# Patient Record
Sex: FEMALE | Race: ASIAN | NOT HISPANIC OR LATINO | Employment: OTHER
[De-identification: names, ages, dates, MRNs, and addresses within clinical notes are randomized per-mention and may not be internally consistent; named-entity substitution may affect disease eponyms.]

---

## 2017-05-09 ENCOUNTER — HOSPITAL (OUTPATIENT)
Dept: OTHER | Age: 62
End: 2017-05-09
Attending: FAMILY MEDICINE

## 2021-01-05 ENCOUNTER — HOSPITAL ENCOUNTER (OUTPATIENT)
Dept: ULTRASOUND IMAGING | Age: 66
Discharge: HOME | End: 2021-01-05
Attending: FAMILY MEDICINE

## 2021-01-05 DIAGNOSIS — N18.31 STAGE 3A CHRONIC KIDNEY DISEASE (CMD): ICD-10-CM

## 2021-01-05 PROCEDURE — 76775 US EXAM ABDO BACK WALL LIM: CPT

## 2021-04-19 ENCOUNTER — HOSPITAL ENCOUNTER (OUTPATIENT)
Dept: GENERAL RADIOLOGY | Age: 66
Discharge: HOME | End: 2021-04-19

## 2021-04-19 DIAGNOSIS — R29.898 WEAKNESS OF BOTH LEGS: ICD-10-CM

## 2021-04-19 PROCEDURE — 73522 X-RAY EXAM HIPS BI 3-4 VIEWS: CPT

## 2021-04-19 PROCEDURE — 72110 X-RAY EXAM L-2 SPINE 4/>VWS: CPT

## 2021-04-19 PROCEDURE — 73562 X-RAY EXAM OF KNEE 3: CPT

## 2021-07-29 ENCOUNTER — TELEPHONE (OUTPATIENT)
Dept: NEUROLOGY | Facility: CLINIC | Age: 66
End: 2021-07-29

## 2021-07-29 NOTE — TELEPHONE ENCOUNTER
Patient's son called to schedule. Created appointment, currently have [facility] hmo referral on file.

## 2021-08-26 ENCOUNTER — OFFICE VISIT (OUTPATIENT)
Dept: NEUROLOGY | Facility: CLINIC | Age: 66
End: 2021-08-26
Payer: COMMERCIAL

## 2021-08-26 ENCOUNTER — TELEPHONE (OUTPATIENT)
Dept: NEUROLOGY | Facility: CLINIC | Age: 66
End: 2021-08-26

## 2021-08-26 VITALS — SYSTOLIC BLOOD PRESSURE: 140 MMHG | HEART RATE: 93 BPM | DIASTOLIC BLOOD PRESSURE: 79 MMHG | RESPIRATION RATE: 16 BRPM

## 2021-08-26 DIAGNOSIS — G20 PARKINSONISM, UNSPECIFIED PARKINSONISM TYPE (HCC): Primary | ICD-10-CM

## 2021-08-26 PROCEDURE — 3078F DIAST BP <80 MM HG: CPT | Performed by: OTHER

## 2021-08-26 PROCEDURE — 99203 OFFICE O/P NEW LOW 30 MIN: CPT | Performed by: OTHER

## 2021-08-26 PROCEDURE — 3077F SYST BP >= 140 MM HG: CPT | Performed by: OTHER

## 2021-08-26 RX ORDER — ATORVASTATIN CALCIUM 10 MG/1
1 TABLET, FILM COATED ORAL DAILY
COMMUNITY
Start: 2021-06-01

## 2021-08-26 RX ORDER — CARVEDILOL 12.5 MG/1
1 TABLET ORAL 2 TIMES DAILY
COMMUNITY
Start: 2021-06-01

## 2021-08-26 RX ORDER — LOSARTAN POTASSIUM 100 MG/1
1 TABLET ORAL DAILY
COMMUNITY
Start: 2021-06-01

## 2021-08-26 RX ORDER — PIOGLITAZONEHYDROCHLORIDE 30 MG/1
1 TABLET ORAL DAILY
COMMUNITY
Start: 2021-06-01

## 2021-08-26 RX ORDER — ACARBOSE 100 MG/1
1 TABLET ORAL 2 TIMES DAILY
COMMUNITY
Start: 2021-04-28

## 2021-08-26 RX ORDER — GLIPIZIDE 5 MG/1
1 TABLET ORAL 2 TIMES DAILY
COMMUNITY
Start: 2021-06-01

## 2021-08-26 RX ORDER — AMLODIPINE BESYLATE 5 MG/1
1 TABLET ORAL DAILY
COMMUNITY
Start: 2021-08-02

## 2021-08-26 RX ORDER — ALENDRONATE SODIUM 70 MG/1
1 TABLET ORAL WEEKLY
COMMUNITY
Start: 2021-06-01

## 2021-08-26 RX ORDER — LEVOTHYROXINE SODIUM 0.07 MG/1
1 TABLET ORAL DAILY
COMMUNITY
Start: 2021-06-01

## 2021-08-26 NOTE — PATIENT INSTRUCTIONS
Dr. Monique updated with concerns of worsening drainage on wounds.    Per Dr. Monique, recommends to have cultures of area collected.     Dr. Morales and team updated.    Refill policies:    • Allow 2-3 business days for refills; controlled substances may take longer.   • Contact your pharmacy at least 5 days prior to running out of medication and have them send an electronic request or submit request through the “request re Depending on your insurance carrier, approval may take 3-10 days. It is highly recommended patients contact their insurance carrier directly to determine coverage.   If test is done without insurance authorization, patient may be responsible for the entire

## 2021-08-26 NOTE — TELEPHONE ENCOUNTER
During check out pts daughter Luis Garrido asked if pt should receive any physical therapy due to difficulty walking and balance issues. Call Awajazmin Radhika to advise.

## 2021-08-26 NOTE — PROGRESS NOTES
[id number] [place] [address] with [facility]  8/26/2021    4:24 PM      79 yo [facility] Female who presents with balance problem    HPI:  The last few months has been walking like a 78 yo and became fearful of falling includes Diabetes in her mother; Hypertension in her mother.     EXAM:  /79 (BP Location: Left arm, Patient Position: Sitting, Cuff Size: adult)   Pulse 93   Resp 16     NEURO  Patient rarely spoke during the conversation and during the examination al early PSP      I told the family that before I could make a final determination in diagnosis, it would be best for me to review the actual images of the brain MRI that were done and I would like to per use any available laboratory testings that have been d

## 2021-08-27 ENCOUNTER — TELEPHONE (OUTPATIENT)
Dept: NEUROLOGY | Facility: CLINIC | Age: 66
End: 2021-08-27

## 2021-08-27 DIAGNOSIS — G20 PARKINSONISM, UNSPECIFIED PARKINSONISM TYPE (HCC): Primary | ICD-10-CM

## 2021-08-27 NOTE — TELEPHONE ENCOUNTER
CD's unable to upload in PACS   2CD'S were placed on Dr. Katrina Mejia bin to review and MRI  CT scan report was also placed on the bin.

## 2021-08-30 NOTE — TELEPHONE ENCOUNTER
Pts son Balbina Hawkins requested a call back to discuss pts imaging results and next steps for treatment. Call pts son to advise.

## 2021-08-30 NOTE — TELEPHONE ENCOUNTER
BTW      Can you call them to tell daughter I spoke with Dr Cassius Tang about the patient and we are ordering SYDNI Scan to rule out PArkinsonism  Also, I am OK with PTx    Dr Kristian Higginbotham

## 2021-08-30 NOTE — TELEPHONE ENCOUNTER
Genoveva Marroquin there is a telephone call made by Asmita Mcclain with daughter already instructing them to do Nuclear Medicine SYDNI scan.   I will not be able to look into the scan until Friday because it cannot be uploaded to our PACs    Dr Brayan Sarabia

## 2021-08-30 NOTE — TELEPHONE ENCOUNTER
Called son Sundeep Mckeon, patient answered. Son has gone to work and patient does not speak english. Was able to communicate for son to call this office tomorrow.    Wish to inform son that Dr Ronni Plasencia is out of office until Friday, but will send message that Hari

## 2021-08-30 NOTE — TELEPHONE ENCOUNTER
Patient's daughter informed, verbalized understanding. Order to be mailed to patient's home address. Order placed and mailed as requested.

## 2021-09-03 NOTE — TELEPHONE ENCOUNTER
Daughter called about order for SYDNI scan. Informed it was mailed to pt's on 8/31/21, but goes to our mailroom first, so may take a few days. Advised I will contact Dr. Will Kidney office regarding referral and location for kristina.

## 2021-09-03 NOTE — TELEPHONE ENCOUNTER
LMTCB.  SYDNI scan needs to be done at [facility] location. [facility] with Dr. Shirlene Sales office is working on referral, but he is out of town until Thursday.

## 2021-09-09 ENCOUNTER — TELEPHONE (OUTPATIENT)
Dept: NEUROLOGY | Facility: CLINIC | Age: 66
End: 2021-09-09

## 2021-09-09 NOTE — TELEPHONE ENCOUNTER
Patient's son calling, requesting an update regarding O referral for NM SYDNI brain scan. Advised it is currently pending, will send to prior auth team to verify. Patient is also wondering if this can be done at [facility].     Please ad

## 2021-09-09 NOTE — TELEPHONE ENCOUNTER
Called son back explained the PCP Dr. Jolene Pena is working on this referral not us. This code does not go through [facility]. Also explained that [facility] can not do this test. He will contact the PCP if he does not hear from them soon.

## 2021-09-13 NOTE — TELEPHONE ENCOUNTER
Left message with daughter Gabrielle Kearns that I reviewed the MRI of the brain which essentially just showed minor atrophy and I was curious to know whether they have scheduled the nuclear med SYDNI Scan.

## 2021-10-15 ENCOUNTER — HOSPITAL ENCOUNTER (OUTPATIENT)
Dept: NUCLEAR MEDICINE | Facility: HOSPITAL | Age: 66
Discharge: HOME | End: 2021-10-15
Attending: Other
Payer: MEDICARE

## 2021-10-15 DIAGNOSIS — G20 PARKINSONISM, UNSPECIFIED PARKINSONISM TYPE (HCC): ICD-10-CM

## 2021-10-15 PROCEDURE — 78803 RP LOCLZJ TUM SPECT 1 AREA: CPT | Performed by: OTHER

## 2021-10-18 ENCOUNTER — TELEPHONE (OUTPATIENT)
Dept: NEUROLOGY | Facility: CLINIC | Age: 66
End: 2021-10-18

## 2021-10-18 NOTE — TELEPHONE ENCOUNTER
Left several messages and sent note.   Please tell son that NM SYDNI scan is consistent with PArkinson and schedule follow up visit

## 2021-10-18 NOTE — PROGRESS NOTES
The Nuclear Medicine scan confirms findings consistent with Parkinsonism  Message left with Son's phone about results.   Please call them and let them know to also schedule a follow up visit    Ban Baker MD  Vascular & General Neurology  Director, [facility]

## 2021-10-19 NOTE — TELEPHONE ENCOUNTER
RN informed the patient the NM SYDNI results. Pt's son wanted to know what Dr. Marianna Elam was going to do next. RN explained that Dr. Marianna Elam wanted the patient to set up a follow-up appt to discuss the next step in her care.  Pt's son stated, \"The office i

## 2021-10-22 ENCOUNTER — TELEPHONE (OUTPATIENT)
Dept: NEUROLOGY | Facility: CLINIC | Age: 66
End: 2021-10-22

## 2021-10-22 NOTE — TELEPHONE ENCOUNTER
LM for patient/family to call office regarding test results.       The Nuclear Medicine scan confirms findings consistent with Parkinsonism   Message left with Son's phone about results. Aracelis Stevenson call them and let them know to also schedule a follow up visit

## 2021-11-09 ENCOUNTER — TELEPHONE (OUTPATIENT)
Dept: NEUROLOGY | Facility: CLINIC | Age: 66
End: 2021-11-09

## 2021-11-09 ENCOUNTER — OFFICE VISIT (OUTPATIENT)
Dept: NEUROLOGY | Facility: CLINIC | Age: 66
End: 2021-11-09
Payer: COMMERCIAL

## 2021-11-09 VITALS — DIASTOLIC BLOOD PRESSURE: 76 MMHG | SYSTOLIC BLOOD PRESSURE: 116 MMHG | HEART RATE: 76 BPM | RESPIRATION RATE: 16 BRPM

## 2021-11-09 DIAGNOSIS — R26.89 BALANCE PROBLEM: ICD-10-CM

## 2021-11-09 DIAGNOSIS — G20 PARKINSONISM, UNSPECIFIED PARKINSONISM TYPE (HCC): Primary | ICD-10-CM

## 2021-11-09 DIAGNOSIS — R25.8 BRADYKINESIA: ICD-10-CM

## 2021-11-09 DIAGNOSIS — G25.81 RESTLESS LEG SYNDROME: ICD-10-CM

## 2021-11-09 PROCEDURE — 3078F DIAST BP <80 MM HG: CPT | Performed by: OTHER

## 2021-11-09 PROCEDURE — 99214 OFFICE O/P EST MOD 30 MIN: CPT | Performed by: OTHER

## 2021-11-09 PROCEDURE — 3074F SYST BP LT 130 MM HG: CPT | Performed by: OTHER

## 2021-11-09 NOTE — PROGRESS NOTES
[facility] with [facility]  [birth year]  Primary Care Provider:  Rabia Stoll MD    11/9/2021  Accompanied visit: daughter and    () No.      78year old yo patient insulin      Allergies:  Not on File         EXAM:  /76   Pulse 76   Resp 16   Looks stated age  General Exam:  HENT:  pink conjunctiva anicteric sclerae  Neck no adenopathy, thyroid normal  Heart and Lungs:  normal  Extremities: no cyanosis, skin ch meetings - patient not present --non F2F  (  ) Independent [employer] Officer obtained    Non Face to Face CPT code 03772/40330 applies as documented above    PROCEDURE DONE     (   ) see notes      After visit, patient was escorted out and handed-off discharge pro

## 2021-11-29 ENCOUNTER — TELEPHONE (OUTPATIENT)
Dept: NEUROLOGY | Facility: CLINIC | Age: 66
End: 2021-11-29

## 2021-11-29 NOTE — TELEPHONE ENCOUNTER
Faxed office visit dated 11/9/21 and 8/26/21 to [facility]. Fax confirmation received.

## 2021-11-30 ENCOUNTER — OFFICE VISIT (OUTPATIENT)
Dept: NEUROLOGY | Facility: CLINIC | Age: 66
End: 2021-11-30

## 2021-12-06 DIAGNOSIS — R26.89 BALANCE PROBLEM: ICD-10-CM

## 2021-12-06 DIAGNOSIS — R25.8 BRADYKINESIA: ICD-10-CM

## 2021-12-06 DIAGNOSIS — G20 PARKINSONISM, UNSPECIFIED PARKINSONISM TYPE (HCC): ICD-10-CM

## 2021-12-13 ENCOUNTER — TELEPHONE (OUTPATIENT)
Dept: NEUROLOGY | Facility: CLINIC | Age: 66
End: 2021-12-13

## 2021-12-13 NOTE — TELEPHONE ENCOUNTER
Daughter calling with one month update after increasing carbidopa-levodopa to TID. Is going well.    Is not able to walk without walker can be flexible    From 530-6pm exhibits sleepiness and low energy, wonders if administration times can be adjusted r/t

## 2021-12-13 NOTE — TELEPHONE ENCOUNTER
Spoke with patient's daughter I believe their expectation is for her to continue being alert after 6 pm and yet it seems patient is OK in daytime when she takes the doses as prescribed  6AM, 11AM and 5 PM    Dr. Katie Pearce

## 2022-01-27 NOTE — TELEPHONE ENCOUNTER
Patient's son, Timothy Tao, calling for results from Brain SYDNI scan. Results in chart, have not been seen. You are cleared for employment.  It is important to follow up with your primary care provider for your yearly comprehensive physical.

## 2022-02-16 ENCOUNTER — TELEPHONE (OUTPATIENT)
Dept: NEUROLOGY | Facility: CLINIC | Age: 67
End: 2022-02-16

## 2022-02-16 NOTE — TELEPHONE ENCOUNTER
[facility] Auth number [id number]  3 visits authorized  2/16/22-5/17/22    Referral sent to scan.

## 2022-02-18 ENCOUNTER — OFFICE VISIT (OUTPATIENT)
Dept: NEUROLOGY | Facility: CLINIC | Age: 67
End: 2022-02-18
Payer: COMMERCIAL

## 2022-02-18 VITALS
SYSTOLIC BLOOD PRESSURE: 125 MMHG | DIASTOLIC BLOOD PRESSURE: 80 MMHG | HEART RATE: 100 BPM | WEIGHT: 125 LBS | BODY MASS INDEX: 21.34 KG/M2 | RESPIRATION RATE: 16 BRPM | HEIGHT: 64 IN

## 2022-02-18 DIAGNOSIS — G20 PARKINSONISM, UNSPECIFIED PARKINSONISM TYPE (HCC): Primary | ICD-10-CM

## 2022-02-18 DIAGNOSIS — R25.8 BRADYKINESIA: ICD-10-CM

## 2022-02-18 DIAGNOSIS — R26.89 BALANCE PROBLEM: ICD-10-CM

## 2022-02-18 PROCEDURE — 3079F DIAST BP 80-89 MM HG: CPT | Performed by: OTHER

## 2022-02-18 PROCEDURE — 99214 OFFICE O/P EST MOD 30 MIN: CPT | Performed by: OTHER

## 2022-02-18 PROCEDURE — 3008F BODY MASS INDEX DOCD: CPT | Performed by: OTHER

## 2022-02-18 PROCEDURE — 3074F SYST BP LT 130 MM HG: CPT | Performed by: OTHER

## 2022-02-23 ENCOUNTER — TELEPHONE (OUTPATIENT)
Dept: NEUROLOGY | Facility: CLINIC | Age: 67
End: 2022-02-23

## 2022-02-23 NOTE — TELEPHONE ENCOUNTER
Fax received from [phone] requesting clarification of Carbidopa/Levodopa prescription. Conflicting administration orders. Per Epic review:    Discussion plus Diagnostics & Treatment Orders:  Trial of increasing Sinemet to 1.5 tab same time  Refer to [facility] ED disorder clinic      New prescription sent to pharmacy.

## 2022-04-14 ENCOUNTER — TELEPHONE (OUTPATIENT)
Dept: SCHEDULING | Age: 67
End: 2022-04-14

## 2022-04-14 DIAGNOSIS — Z13.6 SCREENING FOR CARDIOVASCULAR CONDITION: Primary | ICD-10-CM

## 2022-04-28 ENCOUNTER — TELEPHONE (OUTPATIENT)
Dept: NEUROLOGY | Facility: CLINIC | Age: 67
End: 2022-04-28

## 2022-04-28 NOTE — TELEPHONE ENCOUNTER
Had Received Consult Report from [facility]. [facility] and blood test results    Records were sent to scan

## 2022-05-26 ENCOUNTER — TELEPHONE (OUTPATIENT)
Dept: NEUROLOGY | Facility: CLINIC | Age: 67
End: 2022-05-26

## 2022-05-26 NOTE — TELEPHONE ENCOUNTER
Had received Tfjmaetoac-uufkn-aala notes from the [facility]   Report sent to scan

## 2022-06-23 ENCOUNTER — HOSPITAL ENCOUNTER (OUTPATIENT)
Dept: CT IMAGING | Age: 67
End: 2022-06-23

## 2022-08-23 ENCOUNTER — OFFICE VISIT (OUTPATIENT)
Dept: NEUROLOGY | Facility: CLINIC | Age: 67
End: 2022-08-23
Payer: COMMERCIAL

## 2022-08-23 VITALS
DIASTOLIC BLOOD PRESSURE: 73 MMHG | SYSTOLIC BLOOD PRESSURE: 144 MMHG | RESPIRATION RATE: 16 BRPM | WEIGHT: 125 LBS | HEIGHT: 64 IN | BODY MASS INDEX: 21.34 KG/M2 | HEART RATE: 97 BPM

## 2022-08-23 DIAGNOSIS — R26.89 BALANCE PROBLEM: ICD-10-CM

## 2022-08-23 DIAGNOSIS — G20 PARKINSONISM, UNSPECIFIED PARKINSONISM TYPE (HCC): Primary | ICD-10-CM

## 2022-08-23 PROCEDURE — 3077F SYST BP >= 140 MM HG: CPT | Performed by: OTHER

## 2022-08-23 PROCEDURE — 3078F DIAST BP <80 MM HG: CPT | Performed by: OTHER

## 2022-08-23 PROCEDURE — 3008F BODY MASS INDEX DOCD: CPT | Performed by: OTHER

## 2022-08-23 PROCEDURE — 99214 OFFICE O/P EST MOD 30 MIN: CPT | Performed by: OTHER

## 2022-08-23 RX ORDER — MELOXICAM 7.5 MG/1
7.5 TABLET ORAL DAILY PRN
Qty: 30 TABLET | Refills: 5 | Status: SHIPPED | OUTPATIENT
Start: 2022-08-23

## 2023-03-17 NOTE — PROGRESS NOTES
Hx of PD sees Dr Mason Gorman. Son called with leg pain     Left leg pain going on initially on and off, constant in the night, knee down the foot. Unable to exactly say what kind of pain, seems like sharp pain but also get muscle cramps. Son says she feels better when they massage the leg. No leg swelling. Advise to call the clinic on Monday and get a sooner appt.  Left leg pain could be radiculopathy or neuropathy, Need evaluation  use lidocaine patch OTC and Advil prn for pain

## 2023-03-23 ENCOUNTER — APPOINTMENT (OUTPATIENT)
Dept: ULTRASOUND IMAGING | Age: 68
End: 2023-03-23
Attending: EMERGENCY MEDICINE

## 2023-03-23 ENCOUNTER — HOSPITAL ENCOUNTER (EMERGENCY)
Age: 68
Discharge: HOME | End: 2023-03-23
Attending: EMERGENCY MEDICINE

## 2023-03-23 VITALS
OXYGEN SATURATION: 99 % | RESPIRATION RATE: 18 BRPM | DIASTOLIC BLOOD PRESSURE: 88 MMHG | HEART RATE: 97 BPM | TEMPERATURE: 98.7 F | SYSTOLIC BLOOD PRESSURE: 168 MMHG

## 2023-03-23 DIAGNOSIS — M79.604 RIGHT LEG PAIN: Primary | ICD-10-CM

## 2023-03-23 DIAGNOSIS — G20.A1 PARKINSON DISEASE: ICD-10-CM

## 2023-03-23 LAB
ANION GAP SERPL CALC-SCNC: 10 MMOL/L (ref 7–19)
BASOPHILS # BLD: 0.1 K/MCL (ref 0–0.3)
BASOPHILS NFR BLD: 1 %
BUN SERPL-MCNC: 12 MG/DL (ref 6–20)
BUN/CREAT SERPL: 19 (ref 7–25)
CALCIUM SERPL-MCNC: 10 MG/DL (ref 8.4–10.2)
CHLORIDE SERPL-SCNC: 97 MMOL/L (ref 97–110)
CO2 SERPL-SCNC: 26 MMOL/L (ref 21–32)
CREAT SERPL-MCNC: 0.62 MG/DL (ref 0.51–0.95)
DEPRECATED RDW RBC: 38.7 FL (ref 39–50)
EOSINOPHIL # BLD: 0.1 K/MCL (ref 0–0.5)
EOSINOPHIL NFR BLD: 1 %
ERYTHROCYTE [DISTWIDTH] IN BLOOD: 12.3 % (ref 11–15)
FASTING DURATION TIME PATIENT: ABNORMAL H
GFR SERPLBLD BASED ON 1.73 SQ M-ARVRAT: >90 ML/MIN
GLUCOSE SERPL-MCNC: 195 MG/DL (ref 70–99)
HCT VFR BLD CALC: 43.8 % (ref 36–46.5)
HGB BLD-MCNC: 14.4 G/DL (ref 12–15.5)
IMM GRANULOCYTES # BLD AUTO: 0.1 K/MCL (ref 0–0.2)
IMM GRANULOCYTES # BLD: 1 %
LYMPHOCYTES # BLD: 2.3 K/MCL (ref 1–4)
LYMPHOCYTES NFR BLD: 25 %
MCH RBC QN AUTO: 28.2 PG (ref 26–34)
MCHC RBC AUTO-ENTMCNC: 32.9 G/DL (ref 32–36.5)
MCV RBC AUTO: 85.7 FL (ref 78–100)
MONOCYTES # BLD: 0.9 K/MCL (ref 0.3–0.9)
MONOCYTES NFR BLD: 9 %
NEUTROPHILS # BLD: 6.1 K/MCL (ref 1.8–7.7)
NEUTROPHILS NFR BLD: 63 %
NRBC BLD MANUAL-RTO: 0 /100 WBC
PLATELET # BLD AUTO: 423 K/MCL (ref 140–450)
POTASSIUM SERPL-SCNC: 4.2 MMOL/L (ref 3.4–5.1)
RBC # BLD: 5.11 MIL/MCL (ref 4–5.2)
SODIUM SERPL-SCNC: 129 MMOL/L (ref 135–145)
WBC # BLD: 9.5 K/MCL (ref 4.2–11)

## 2023-03-23 PROCEDURE — 99283 EMERGENCY DEPT VISIT LOW MDM: CPT

## 2023-03-23 PROCEDURE — 85025 COMPLETE CBC W/AUTO DIFF WBC: CPT | Performed by: EMERGENCY MEDICINE

## 2023-03-23 PROCEDURE — 36415 COLL VENOUS BLD VENIPUNCTURE: CPT

## 2023-03-23 PROCEDURE — 93971 EXTREMITY STUDY: CPT

## 2023-03-23 PROCEDURE — 80048 BASIC METABOLIC PNL TOTAL CA: CPT | Performed by: EMERGENCY MEDICINE

## 2023-03-23 RX ORDER — ACETAMINOPHEN 500 MG
1000 TABLET ORAL ONCE
Status: DISCONTINUED | OUTPATIENT
Start: 2023-03-23 | End: 2023-03-23 | Stop reason: HOSPADM

## 2023-03-24 ENCOUNTER — TELEPHONE (OUTPATIENT)
Dept: NEUROLOGY | Facility: CLINIC | Age: 68
End: 2023-03-24

## 2023-03-24 NOTE — TELEPHONE ENCOUNTER
Pt son Estela Durbin stated pt is having increased balance issues with pain in the right leg. Call Kobe to discuss and advise.     [phone]

## 2023-03-24 NOTE — TELEPHONE ENCOUNTER
Spoke with son Concepción Jean Patient in ED yesterday with RLE pain and worsening balance. Per son, US negative and labs negative  Gave DICLOFENAC cream which does help. Referred to neurology. Patient has OV scheduled 4/25/2023. Encouraged patient to call this office on Monday to try to schedule sooner or get on wait list.  Informed to follow up with PCP or walk-in/ED care if condition deteriorates. Verbalized understanding.

## 2023-04-25 ENCOUNTER — OFFICE VISIT (OUTPATIENT)
Dept: NEUROLOGY | Facility: CLINIC | Age: 68
End: 2023-04-25
Payer: COMMERCIAL

## 2023-04-25 ENCOUNTER — TELEPHONE (OUTPATIENT)
Dept: NEUROLOGY | Facility: CLINIC | Age: 68
End: 2023-04-25

## 2023-04-25 VITALS
HEART RATE: 101 BPM | BODY MASS INDEX: 21.34 KG/M2 | RESPIRATION RATE: 16 BRPM | WEIGHT: 125 LBS | DIASTOLIC BLOOD PRESSURE: 76 MMHG | SYSTOLIC BLOOD PRESSURE: 129 MMHG | HEIGHT: 64 IN

## 2023-04-25 DIAGNOSIS — Z74.09 STIFFNESS DUE TO IMMOBILITY: ICD-10-CM

## 2023-04-25 DIAGNOSIS — M25.60 STIFFNESS DUE TO IMMOBILITY: ICD-10-CM

## 2023-04-25 DIAGNOSIS — G23.1 PROGRESSIVE SUPRANUCLEAR PALSY (HCC): ICD-10-CM

## 2023-04-25 DIAGNOSIS — G20 PARKINSONISM, UNSPECIFIED PARKINSONISM TYPE (HCC): Primary | ICD-10-CM

## 2023-04-25 DIAGNOSIS — R06.01 ORTHOPNEA: Primary | ICD-10-CM

## 2023-04-25 DIAGNOSIS — R25.8 BRADYKINESIA: ICD-10-CM

## 2023-04-25 DIAGNOSIS — R26.89 BALANCE PROBLEM: ICD-10-CM

## 2023-04-25 PROCEDURE — 3074F SYST BP LT 130 MM HG: CPT | Performed by: OTHER

## 2023-04-25 PROCEDURE — 99214 OFFICE O/P EST MOD 30 MIN: CPT | Performed by: OTHER

## 2023-04-25 PROCEDURE — 3008F BODY MASS INDEX DOCD: CPT | Performed by: OTHER

## 2023-04-25 PROCEDURE — 3078F DIAST BP <80 MM HG: CPT | Performed by: OTHER

## 2023-04-25 RX ORDER — LEVOTHYROXINE SODIUM 0.05 MG/1
50 TABLET ORAL
COMMUNITY

## 2023-04-25 RX ORDER — BACLOFEN 10 MG/1
10 TABLET ORAL 2 TIMES DAILY
Qty: 60 TABLET | Refills: 5 | Status: SHIPPED | OUTPATIENT
Start: 2023-04-25

## 2023-04-25 RX ORDER — MIRTAZAPINE 7.5 MG/1
7.5 TABLET, FILM COATED ORAL NIGHTLY
Qty: 30 TABLET | Refills: 5 | Status: SHIPPED | OUTPATIENT
Start: 2023-04-25

## 2023-06-20 ENCOUNTER — TELEPHONE (OUTPATIENT)
Dept: NEUROLOGY | Facility: CLINIC | Age: 68
End: 2023-06-20

## 2023-06-20 ENCOUNTER — MED REC SCAN ONLY (OUTPATIENT)
Dept: NEUROLOGY | Facility: CLINIC | Age: 68
End: 2023-06-20

## 2023-06-20 NOTE — TELEPHONE ENCOUNTER
PT plan of care received for provider review from [facility]. Patient seen for abnormalities of gait and mobility, muscle weakness and abnormal involuntary movements. Paperwork signed and faxed back with receipt confirmation. Please see attached paperwork for details of plan of care. Paperwork to med rec scan dated 6/20/2023.

## 2023-07-03 DIAGNOSIS — G20 PARKINSONISM, UNSPECIFIED PARKINSONISM TYPE (HCC): ICD-10-CM

## 2023-07-03 DIAGNOSIS — R25.8 BRADYKINESIA: ICD-10-CM

## 2023-07-03 DIAGNOSIS — R26.89 BALANCE PROBLEM: ICD-10-CM

## 2023-10-26 ENCOUNTER — OFFICE VISIT (OUTPATIENT)
Dept: NEUROLOGY | Facility: CLINIC | Age: 68
End: 2023-10-26

## 2023-10-26 VITALS
WEIGHT: 125 LBS | HEIGHT: 64 IN | BODY MASS INDEX: 21.34 KG/M2 | RESPIRATION RATE: 16 BRPM | HEART RATE: 95 BPM | DIASTOLIC BLOOD PRESSURE: 81 MMHG | SYSTOLIC BLOOD PRESSURE: 138 MMHG

## 2023-10-26 DIAGNOSIS — G20.C PARKINSONISM, UNSPECIFIED PARKINSONISM TYPE: ICD-10-CM

## 2023-10-26 DIAGNOSIS — R26.89 BALANCE PROBLEM: ICD-10-CM

## 2023-10-26 DIAGNOSIS — R25.8 BRADYKINESIA: ICD-10-CM

## 2023-10-26 PROCEDURE — 1159F MED LIST DOCD IN RCRD: CPT | Performed by: OTHER

## 2023-10-26 PROCEDURE — 3079F DIAST BP 80-89 MM HG: CPT | Performed by: OTHER

## 2023-10-26 PROCEDURE — 3008F BODY MASS INDEX DOCD: CPT | Performed by: OTHER

## 2023-10-26 PROCEDURE — 99214 OFFICE O/P EST MOD 30 MIN: CPT | Performed by: OTHER

## 2023-10-26 PROCEDURE — 3075F SYST BP GE 130 - 139MM HG: CPT | Performed by: OTHER

## 2023-10-30 DIAGNOSIS — R26.89 BALANCE PROBLEM: ICD-10-CM

## 2023-10-30 DIAGNOSIS — G20.C PARKINSONISM, UNSPECIFIED PARKINSONISM TYPE: ICD-10-CM

## 2023-10-30 DIAGNOSIS — R25.8 BRADYKINESIA: ICD-10-CM

## 2024-02-23 ENCOUNTER — TELEPHONE (OUTPATIENT)
Dept: NEUROLOGY | Facility: CLINIC | Age: 69
End: 2024-02-23

## 2024-02-23 NOTE — TELEPHONE ENCOUNTER
Received call from Charissa with [facility]. Advised patient needs neurology clearance for a dental procedure, will be sending fax to our office.    Please advise clearance when forms received.

## 2024-03-06 NOTE — TELEPHONE ENCOUNTER
[facility] stated pt is at their office now and waiting for neurology clearance.    Advised have not received the form and they will fax form now looking for clearance.    Advised will send this message to our clinical staff.

## 2024-07-01 ENCOUNTER — OFFICE VISIT (OUTPATIENT)
Dept: NEUROLOGY | Facility: CLINIC | Age: 69
End: 2024-07-01
Payer: MEDICARE

## 2024-07-01 VITALS
HEART RATE: 96 BPM | WEIGHT: 125 LBS | HEIGHT: 64 IN | BODY MASS INDEX: 21.34 KG/M2 | DIASTOLIC BLOOD PRESSURE: 61 MMHG | RESPIRATION RATE: 16 BRPM | SYSTOLIC BLOOD PRESSURE: 121 MMHG

## 2024-07-01 DIAGNOSIS — G23.1 PSP (PROGRESSIVE SUPRANUCLEAR PALSY) (HCC): Primary | ICD-10-CM

## 2024-07-01 DIAGNOSIS — R25.8 BRADYKINESIA: ICD-10-CM

## 2024-07-01 DIAGNOSIS — R26.89 BALANCE PROBLEM: ICD-10-CM

## 2024-07-01 DIAGNOSIS — G20.C PARKINSONISM, UNSPECIFIED PARKINSONISM TYPE (HCC): ICD-10-CM

## 2024-07-01 PROCEDURE — 3008F BODY MASS INDEX DOCD: CPT | Performed by: OTHER

## 2024-07-01 PROCEDURE — 99214 OFFICE O/P EST MOD 30 MIN: CPT | Performed by: OTHER

## 2024-07-01 PROCEDURE — 3078F DIAST BP <80 MM HG: CPT | Performed by: OTHER

## 2024-07-01 PROCEDURE — 1159F MED LIST DOCD IN RCRD: CPT | Performed by: OTHER

## 2024-07-01 PROCEDURE — 3074F SYST BP LT 130 MM HG: CPT | Performed by: OTHER

## 2024-07-01 NOTE — PATIENT INSTRUCTIONS
Refill policies:    Allow 2-3 business days for refills; controlled substances may take longer.  Contact your pharmacy at least 5 days prior to running out of medication and have them send an electronic request or submit request through the “request refill” option in your [employer] account.  Refills are not addressed on weekends; covering physicians do not authorize routine medications on weekends.  No narcotics or controlled substances are refilled after noon on Fridays or by on call physicians.  By law, narcotics must be electronically prescribed.  A 30 day supply with no refills is the maximum allowed.  If your prescription is due for a refill, you may be due for a follow up appointment.  To best provide you care, patients receiving routine medications need to be seen at least once a year.  Patients receiving narcotic/controlled substance medications need to be seen at least once every 3 months.  In the event that your preferred pharmacy does not have the requested medication in stock (e.g. Backordered), it is your responsibility to find another pharmacy that has the requested medication available.  We will gladly send a new prescription to that pharmacy at your request.    Scheduling Tests:    If your physician has ordered radiology tests such as MRI or CT scans, please contact Central Scheduling at [phone] right away to schedule the test.  Once scheduled, the [facility] Centralized Referral Team will work with your insurance carrier to obtain pre-certification or prior authorization.  Depending on your insurance carrier, approval may take 3-10 days.  It is highly recommended patients assure they have received an authorization before having a test performed.  If test is done without insurance authorization, patient may be responsible for the entire amount billed.      Precertification and Prior Authorizations:  If your physician has recommended that you have a procedure or additional testing performed the [facility]  Centralized Referral Team will contact your insurance carrier to obtain pre-certification or prior authorization.    You are strongly encouraged to contact your insurance carrier to verify that your procedure/test has been approved and is a COVERED benefit.  Although the [facility] Centralized Referral Team does its due diligence, the insurance carrier gives the disclaimer that \"Although the procedure is authorized, this does not guarantee payment.\"    Ultimately the patient is responsible for payment.   Thank you for your understanding in this matter.  Paperwork Completion:  If you require FMLA or disability paperwork for your recovery, please make sure to either drop it off or have it faxed to our office at [phone]. Be sure the form has your name and date of birth on it.  The form will be faxed to our Forms Department and they will complete it for you.  There is a 25$ fee for all forms that need to be filled out.  Please be aware there is a 10-14 day turnaround time.  You will need to sign a release of information (MARIZOL) form if your paperwork does not come with one.  You may call the Forms Department with any questions at [phone].  Their fax number is [phone].

## 2024-07-01 NOTE — PROGRESS NOTES
NEUROLOGY  [facility]       Bhagvavaishnavi Jang  8/16/1955  Primary Care Provider:  Kevin Nelson MD    7/1/2024  68 year old yo,  was last seen on:: October 2023    Seen for/plans last visit:  PSP    Previous visit and existing record notes reviewed in preparation for the face to face visit.  Relevant labs and studies reviewed and will be noted in relevant areas of this record.  Accompanied visit: family    () No.      Present condition:  Balance is bad   Uses WC, family afraid she might fall  CDLD adjustment last time did not make any difference      Past History update/new problem(s): no new    Review of Systems:  Review of Systems:  Denies systemic symptoms     No CP or SOB.  No GI or  symptoms. Relevant Neuro as noted above.      Medications:      Current Outpatient Medications:     carbidopa-levodopa  MG Oral Tab, 2 tab 3 times during the day and 1 tab bedtime, Disp: 630 tablet, Rfl: 3    levothyroxine 50 MCG Oral Tab, Take 1 tablet (50 mcg total) by mouth before breakfast., Disp: , Rfl:     baclofen 10 MG Oral Tab, Take 1 tablet (10 mg total) by mouth 2 (two) times daily., Disp: 60 tablet, Rfl: 5    mirtazapine 7.5 MG Oral Tab, Take 1 tablet (7.5 mg total) by mouth nightly., Disp: 30 tablet, Rfl: 5    Meloxicam 7.5 MG Oral Tab, Take 1 tablet (7.5 mg total) by mouth daily as needed for Pain., Disp: 30 tablet, Rfl: 5    alendronate 70 MG Oral Tab, Take 1 tablet (70 mg total) by mouth once a week., Disp: , Rfl:     carvedilol 12.5 MG Oral Tab, Take 1 tablet (12.5 mg total) by mouth in the morning and 1 tablet (12.5 mg total) before bedtime., Disp: , Rfl:     losartan 100 MG Oral Tab, Take 1 tablet (100 mg total) by mouth daily., Disp: , Rfl:     metFORMIN 850 MG Oral Tab, Take 1 tablet (850 mg total) by mouth in the morning and 1 tablet (850 mg total) before bedtime., Disp: , Rfl:     amLODIPine 5 MG Oral Tab, Take 1 tablet (5 mg total) by mouth daily., Disp: , Rfl:    PRN:     Allergies:  Allergies   Allergen Reactions    Ibuprofen RASH          EXAM:  /61 (BP Location: Right arm, Patient Position: Sitting, Cuff Size: adult)   Pulse 96   Resp 16   Ht 64\"   Wt 125 lb (56.7 kg)   BMI 21.46 kg/m²   Looks stated age  General Exam:  HENT:  pink conjunctiva anicteric sclerae  Neck no adenopathy, thyroid normal  Heart and Lungs:  normal  Extremities: no cyanosis, skin changes    NEURO  Slow responses  EYE movement with vertical limitation mostly upward but down gaze is also affected   Side motion better     Slowed fFM  Tone increased truncal tone and also limb  WC   Gait not tested today,  needs to have assistance    INTERPRETATION of RELEVANT LABS and other DATA:          Problem/s Identified this visit:   1. PSP (progressive supranuclear palsy) (HCC)    2. Bradykinesia    3. Balance problem    4. Parkinsonism, unspecified Parkinsonism type (HCC)          Discussion plus Diagnostics & Treatment Orders:  Increase CDLD to 2 tab TID  Refer to Marco Lunsford    Orders Placed This Encounter    DISCONTD: carbidopa-levodopa  MG Oral Tab     Si.5 tab 3 times during the day and 1 tab bedtime     Dispense:  500 tablet     Refill:  3    carbidopa-levodopa  MG Oral Tab     Si tab 3 times during the day and 1 tab bedtime     Dispense:  630 tablet     Refill:  3         (x) Discussed potential side effects of any treatment relevant to above.  Includes explanation of tests as necessary.    Return in about 8 months (around 3/1/2025).      Patient understands that if needed, based on condition and or test results, follow up will be readjusted      Xavier Abbott MD  Vascular & General Neurology  Director, Multiple Sclerosis Program  [facility]  2024, Time completed 11:47 AM    Decision making:  ( x ) labs reviewed/ordered - 1  (  ) new diagnosis: - 1  ( x) Images & studies independently reviewed -non F2F  (  ) Case/studies discussed with  other caregivers - -non F2F  (  ) Telephone time with patiern or authorized [facility] member--non F2F  ( x ) other records reviewed --non F2F including consultations  (  ) [facility] meetings - patient not present --non F2F  (  ) Independent Historian obtained    Non Face to Face CPT code 78494/53171 applies as documented above    PROCEDURE DONE     (   ) see notes        After visit, patient was escorted out and handed-off discharge process and instructions to the check out desk.  No additional issues relevant to visit were raised to staff at this time interval.        This document is to be interpreted as my current opinion regarding the case as of the stated date of service based on the information available to me at this time and may supersedes any prior opinion expressed either orally or in writing.  Services rendered are only within the scope of direct medical care  Sometimes, reports may have been prepared partially using a speech recognition software technology.  If a word or phrase is confusing or out of context, please do not hesitate to call for clarification.

## 2024-07-11 ENCOUNTER — TELEPHONE (OUTPATIENT)
Dept: NEUROLOGY | Facility: CLINIC | Age: 69
End: 2024-07-11

## 2024-07-11 NOTE — TELEPHONE ENCOUNTER
Received repeat fax requesting clarification on Carbidopa.    Correct dosage is reflected in Epic. Patient taking 2 tabs 3 times during the day and 1 tab at bedtime.    Clarification provided again to pharmacy.    Instructed to disregard further clarification requests.

## 2025-03-26 ENCOUNTER — OFFICE VISIT (OUTPATIENT)
Dept: NEUROLOGY | Facility: CLINIC | Age: 70
End: 2025-03-26
Payer: MEDICARE

## 2025-03-26 VITALS
SYSTOLIC BLOOD PRESSURE: 124 MMHG | HEART RATE: 92 BPM | WEIGHT: 120 LBS | BODY MASS INDEX: 20.49 KG/M2 | HEIGHT: 64 IN | DIASTOLIC BLOOD PRESSURE: 75 MMHG | RESPIRATION RATE: 16 BRPM

## 2025-03-26 DIAGNOSIS — G23.1 PSP (PROGRESSIVE SUPRANUCLEAR PALSY) (HCC): ICD-10-CM

## 2025-03-26 DIAGNOSIS — R26.89 BALANCE PROBLEM: ICD-10-CM

## 2025-03-26 DIAGNOSIS — G20.C PARKINSONISM, UNSPECIFIED PARKINSONISM TYPE (HCC): ICD-10-CM

## 2025-03-26 DIAGNOSIS — R25.8 BRADYKINESIA: ICD-10-CM

## 2025-03-26 PROCEDURE — 3074F SYST BP LT 130 MM HG: CPT | Performed by: OTHER

## 2025-03-26 PROCEDURE — 99214 OFFICE O/P EST MOD 30 MIN: CPT | Performed by: OTHER

## 2025-03-26 PROCEDURE — 3008F BODY MASS INDEX DOCD: CPT | Performed by: OTHER

## 2025-03-26 PROCEDURE — 3078F DIAST BP <80 MM HG: CPT | Performed by: OTHER

## 2025-03-26 PROCEDURE — 1159F MED LIST DOCD IN RCRD: CPT | Performed by: OTHER

## 2025-03-26 RX ORDER — CARBIDOPA AND LEVODOPA 25; 100 MG/1; MG/1
TABLET ORAL
Qty: 630 TABLET | Refills: 3 | Status: SHIPPED | OUTPATIENT
Start: 2025-03-26

## 2025-03-26 RX ORDER — ATORVASTATIN CALCIUM 20 MG/1
20 TABLET, FILM COATED ORAL DAILY
COMMUNITY
Start: 2025-01-27

## 2025-03-26 NOTE — PATIENT INSTRUCTIONS
Refill policies:    Allow 2-3 business days for refills; controlled substances may take longer.  Contact your pharmacy at least 5 days prior to running out of medication and have them send an electronic request or submit request through the “request refill” option in your [employer] account.  Refills are not addressed on weekends; covering physicians do not authorize routine medications on weekends.  No narcotics or controlled substances are refilled after noon on Fridays or by on call physicians.  By law, narcotics must be electronically prescribed.  A 30 day supply with no refills is the maximum allowed.  If your prescription is due for a refill, you may be due for a follow up appointment.  To best provide you care, patients receiving routine medications need to be seen at least once a year.  Patients receiving narcotic/controlled substance medications need to be seen at least once every 3 months.  In the event that your preferred pharmacy does not have the requested medication in stock (e.g. Backordered), it is your responsibility to find another pharmacy that has the requested medication available.  We will gladly send a new prescription to that pharmacy at your request.    Scheduling Tests:    If your physician has ordered radiology tests such as MRI or CT scans, please contact Central Scheduling at [phone] right away to schedule the test.  Once scheduled, the [facility] Centralized Referral Team will work with your insurance carrier to obtain pre-certification or prior authorization.  Depending on your insurance carrier, approval may take 3-10 days.  It is highly recommended patients assure they have received an authorization before having a test performed.  If test is done without insurance authorization, patient may be responsible for the entire amount billed.      Precertification and Prior Authorizations:  If your physician has recommended that you have a procedure or additional testing performed the [facility]  Centralized Referral Team will contact your insurance carrier to obtain pre-certification or prior authorization.    You are strongly encouraged to contact your insurance carrier to verify that your procedure/test has been approved and is a COVERED benefit.  Although the [facility] Centralized Referral Team does its due diligence, the insurance carrier gives the disclaimer that \"Although the procedure is authorized, this does not guarantee payment.\"    Ultimately the patient is responsible for payment.   Thank you for your understanding in this matter.  Paperwork Completion:  If you require FMLA or disability paperwork for your recovery, please make sure to either drop it off or have it faxed to our office at [phone]. Be sure the form has your name and date of birth on it.  The form will be faxed to our Forms Department and they will complete it for you.  There is a 25$ fee for all forms that need to be filled out.  Please be aware there is a 10-14 day turnaround time.  You will need to sign a release of information (MARIZOL) form if your paperwork does not come with one.  You may call the Forms Department with any questions at [phone].  Their fax number is [phone].

## 2025-03-26 NOTE — PROGRESS NOTES
NEUROLOGY  [facility]       Bhagvavaishnavi Jang  8/16/1955  Primary Care Provider:  Kevin Nelson MD    3/26/2025  69 year old yo,  was last seen on:: July 2024    Seen for/plans last visit:  PSP    Previous visit and existing record notes reviewed in preparation for the face to face visit.  Relevant labs and studies reviewed and will be noted in relevant areas of this record.  Accompanied visit: famly     () No.      Present condition:  Since her last visit the only difficulty has been the observed intermittent challenges with swallowing however if she is attended to 1 and 1 she would be able to swallow medications and food without choking although with some visible struggle (slow).    Because of the good care provided by family members she has been doing well.  They did express some concern about pigmentation in her buttocks.    Past History update/new problem(s): No new issues.    Review of Systems:  Review of Systems:  Denies systemic symptoms.     No CP or SOB.  No GI or  symptoms. Relevant Neuro as noted above.      Medications:      Current Outpatient Medications:     atorvastatin 20 MG Oral Tab, Take 1 tablet (20 mg total) by mouth daily., Disp: , Rfl:     carbidopa-levodopa  MG Oral Tab, 2 tab 3 times during the day and 1 tab bedtime, Disp: 630 tablet, Rfl: 3    levothyroxine 50 MCG Oral Tab, Take 1 tablet (50 mcg total) by mouth before breakfast., Disp: , Rfl:     baclofen 10 MG Oral Tab, Take 1 tablet (10 mg total) by mouth 2 (two) times daily., Disp: 60 tablet, Rfl: 5    mirtazapine 7.5 MG Oral Tab, Take 1 tablet (7.5 mg total) by mouth nightly., Disp: 30 tablet, Rfl: 5    Meloxicam 7.5 MG Oral Tab, Take 1 tablet (7.5 mg total) by mouth daily as needed for Pain., Disp: 30 tablet, Rfl: 5    alendronate 70 MG Oral Tab, Take 1 tablet (70 mg total) by mouth once a week., Disp: , Rfl:     carvedilol 12.5 MG Oral Tab, Take 1 tablet (12.5 mg total) by mouth in the  morning and 1 tablet (12.5 mg total) before bedtime., Disp: , Rfl:     losartan 100 MG Oral Tab, Take 1 tablet (100 mg total) by mouth daily., Disp: , Rfl:     metFORMIN 850 MG Oral Tab, Take 1 tablet (850 mg total) by mouth in the morning and 1 tablet (850 mg total) before bedtime., Disp: , Rfl:     amLODIPine 5 MG Oral Tab, Take 1 tablet (5 mg total) by mouth daily., Disp: , Rfl:   PRN:     Allergies:  Allergies[1]       EXAM:  /75 (BP Location: Left arm, Patient Position: Sitting, Cuff Size: adult)   Pulse 92   Resp 16   Ht 64\"   Wt 120 lb (54.4 kg)   BMI 20.60 kg/m²   Looks stated age  General Exam:  HENT:  pink conjunctiva anicteric sclerae  Neck no adenopathy, thyroid normal  Heart and Lungs:  normal  Extremities: no cyanosis, skin changes  There seems to be more of hyperpigmentation of the skin around the perianal region bilaterally.  They are not located in pressure points.    NEURO  She is trying to be conversant and is responsive in an appropriate way to my questions although, verbally she can be slow.    Cranial nerves the same findings of vertical gaze difficulty and saccadic horizontal movements.  Tongue is slowly moved side-to-side.    She is able to move both legs slowly while seated in a wheelchair.  Arms were likewise slow to move.      INTERPRETATION of RELEVANT LABS and other DATA:          Problem/s Identified this visit:   1. PSP (progressive supranuclear palsy) (HCC)    2. Bradykinesia    3. Balance problem    4. Parkinsonism, unspecified Parkinsonism type (HCC)          Discussion plus Diagnostics & Treatment Orders:  Orders Placed This Encounter    atorvastatin 20 MG Oral Tab     Sig: Take 1 tablet (20 mg total) by mouth daily.    carbidopa-levodopa  MG Oral Tab     Si tab 3 times during the day and 1 tab bedtime     Dispense:  630 tablet     Refill:  3     I advised the family to consult with Dr. Nelson about the patient's difficulty swallowing and also the family is  concerned about pigmentation about the buttocks area.      (x) Discussed potential side effects of any treatment relevant to above.  Includes explanation of tests as necessary.    1 year    Patient understands that if needed, based on condition and or test results, follow up will be readjusted      Xavier Abbott MD  Vascular & General Neurology  Director, Multiple Sclerosis Program  [facility]  3/26/2025, Time completed 4:43 PM    Decision making:  ( x ) labs reviewed/ordered - 1  (  ) new diagnosis: - 1  ( x) Images & studies independently reviewed -non F2F  (  ) Case/studies discussed with other caregivers - -non F2F  (  ) Telephone time with patiern or authorized [facility] member--non F2F  ( x ) other records reviewed --non F2F including consultations  (  ) [facility] meetings - patient not present --non F2F  (  ) Independent Historian obtained    Non Face to Face CPT code 47814/28556 applies as documented above    PROCEDURE DONE     (   ) see notes        After visit, patient was escorted out and handed-off discharge process and instructions to the check out desk.  No additional issues relevant to visit were raised to staff at this time interval.        This document is to be interpreted as my current opinion regarding the case as of the stated date of service based on the information available to me at this time and may supersedes any prior opinion expressed either orally or in writing.  Services rendered are only within the scope of direct medical care  Sometimes, reports may have been prepared partially using a speech recognition software technology.  If a word or phrase is confusing or out of context, please do not hesitate to call for clarification.              [1]   Allergies  Allergen Reactions    Ibuprofen RASH

## (undated) DIAGNOSIS — R26.89 BALANCE PROBLEM: ICD-10-CM

## (undated) DIAGNOSIS — G20 PARKINSONISM, UNSPECIFIED PARKINSONISM TYPE (HCC): ICD-10-CM

## (undated) DIAGNOSIS — R25.8 BRADYKINESIA: ICD-10-CM